# Patient Record
Sex: MALE | Race: AMERICAN INDIAN OR ALASKA NATIVE
[De-identification: names, ages, dates, MRNs, and addresses within clinical notes are randomized per-mention and may not be internally consistent; named-entity substitution may affect disease eponyms.]

---

## 2017-05-09 ENCOUNTER — HOSPITAL ENCOUNTER (EMERGENCY)
Dept: HOSPITAL 5 - ED | Age: 32
Discharge: HOME | End: 2017-05-09
Payer: SELF-PAY

## 2017-05-09 VITALS — SYSTOLIC BLOOD PRESSURE: 132 MMHG | DIASTOLIC BLOOD PRESSURE: 98 MMHG
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
## 2017-05-09 DIAGNOSIS — M25.522: Primary | ICD-10-CM

## 2017-05-09 NOTE — EMERGENCY DEPARTMENT REPORT
Upper Extremity





- HPI


Upper Extremity: Left Forearm


Occurred When: 4 Days


Mechanism: Unsure


Severity: severe


Symptoms: Yes Pain with Movement, No Deformity, No Limited Range of Movement, 

No Numbness (tingling to the fingers), No Weakness, No Swelling, No Bruising/

Ecchymosis, No Laceration or Abrasion


Other History: 32-year-old -American male comes in for complaint of left 

elbow pain that started about Saturday.  Patient reports that he had hit his 

elbow on Saturday and pain is progressively got worse to the point where last 

night he had to come in.  Patient reports that he is taking ibuprofen which 

equals up to 400 mg times one.  As well as to the trauma doctor 50 mg last 

night without much relief.  Patient denies any past medical history currently 

takes no medication and has no known drug allergies.





<KARELY CASTELLANOS - Last Filed: 05/09/17 10:41>





<YASEMIN RENE - Last Filed: 05/09/17 15:53>





- [facility]


Chief Complaint: Extremity Injury, Upper


Stated Complaint: LT ARM PAIN


Time Seen by Provider: 05/09/17 09:54





ED Review of Systems


ROS: 


Stated complaint: LT ARM PAIN


Other details as noted in HPI





Constitutional: denies: chills, fever


Eyes: denies: eye pain, eye discharge, vision change


ENT: denies: ear pain, throat pain


Respiratory: denies: cough, shortness of breath, wheezing


Cardiovascular: denies: chest pain, palpitations


Endocrine: no symptoms reported


Gastrointestinal: denies: abdominal pain, nausea, diarrhea


Genitourinary: denies: urgency, dysuria


Musculoskeletal: arthralgia (left elbow pain)


Skin: denies: rash, lesions


Neurological: denies: headache, weakness, paresthesias


Psychiatric: denies: anxiety, depression


Hematological/Lymphatic: denies: easy bleeding, easy bruising





<KARELY CASTELLANOS - Last Filed: 05/09/17 10:41>


ROS: 


Stated complaint: LT ARM PAIN


Other details as noted in HPI








<YASEMIN RENE - Last Filed: 05/09/17 15:53>





ED Past Medical Hx





- Past Medical History


Previous Medical History?: No





- Surgical History


Past Surgical History?: No





- Social History


Smoking Status: Current Every Day Smoker


Substance Use Type: Non Opiate Pain





<KARELY CASTELLANOS - Last Filed: 05/09/17 10:41>





<YASEMIN RENE - Last Filed: 05/09/17 15:53>





- Medications


Home Medications: 


 Home Medications











 Medication  Instructions  Recorded  Confirmed  Last Taken  Type


 


Cyclobenzaprine [Flexeril 10 MG 10 mg PO TID PRN #15 tablet 09/15/15  Unknown Rx





TAB]     


 


traMADol [Ultram] 50 mg PO Q4HR PRN #15 tablet 09/15/15  Unknown Rx


 


Ibuprofen [Motrin 600 MG tab] 600 mg PO Q8H PRN #30 tablet 05/09/17  Unknown Rx














Upper Extremity Exam





- Exam


General: 


Vital signs noted. No distress. Alert and acting appropriately.





Head and Torso: No HEENT Abnormality, No Neck Tenderness, No Chest/Lungs 

Abnormality, No Abdominal Tenderness, No Back Tenderness


Shoulder Exam: Yes Normal Range of Motion in Shoulder, No Shoulder Tenderness, 

No Clavicle Tenderness, No Shoulder Deformity, No AC Joint Tenderness


Elbow: Yes Elbow Tenderness, Yes Normal Range of Motion in Elbow, No Elbow 

Deformity


Forearm: No Forearm Tenderness, No Forearm Deformity, No Pain with Pronation, 

No Pain with Supination


Wrist: No Wrist Tenderness, No Normal ROM in Wrist, No Wrist Deformity, No 

Snuffbox Tenderness, No Pain with Axial Thumb Compression


Hand: Yes Normal ROM in Digit(s), No Hand Tenderness, No Hand Deformity, No 

Digit Tenderness, No Digit(s) Deformity


CMS Exam: Yes Normal Distal Pulses, Yes Normal Capillary Refill, Yes Normal 

Distal Sensation, No Broken Skin





<KARELY CASTELLANOS - Last Filed: 05/09/17 10:41>





- Exam


General: 


Vital signs noted. No distress. Alert and acting appropriately.








<YASEMIN RENE - Last Filed: 05/09/17 15:53>





ED Course


 Vital Signs











  05/09/17





  07:17


 


Temperature 97.7 F


 


Pulse Rate 81


 


Respiratory 18





Rate 


 


Blood Pressure 132/98


 


O2 Sat by Pulse 98





Oximetry 














<JASMINKARELY ZHU - Last Filed: 05/09/17 10:41>


 Vital Signs











  05/09/17 05/09/17





  07:17 11:20


 


Temperature 97.7 F 98.7 F


 


Pulse Rate 81 80


 


Respiratory 18 18





Rate  


 


Blood Pressure 132/98 


 


Blood Pressure  132/98





[Left]  


 


O2 Sat by Pulse 98 99





Oximetry  














<BEREKET RENEA RASHID - Last Filed: 05/09/17 15:53>





ED Medical Decision Making





- Medical Decision Making





Patient has been evaluated by this provider fast track.  Discussed with patient 

that we will do an x-ray of his left elbow.  We will give patient pain 

medication will consist of Norco 5 mg over 325.  We will determine treatment 

based on x-ray results.  Patient verbalized understanding





<KARELY CASTELLANOS - Last Filed: 05/09/17 10:41>





- Radiology Data


Radiology results: report reviewed (xr left elbow, normal)





<EDGARDYASEMIN C - Last Filed: 05/09/17 15:53>


Critical care attestation.: 


If time is entered above; I have spent that time in minutes in the direct care 

of this critically ill patient, excluding procedure time.








<KARELY CASTELLANOS - Last Filed: 05/09/17 10:41>


Critical care attestation.: 


If time is entered above; I have spent that time in minutes in the direct care 

of this critically ill patient, excluding procedure time.








<EDGARDYASEMIN RASHID - Last Filed: 05/09/17 15:53>





ED Disposition


Is pt being admited?: No


Does the pt Need Aspirin: No





<KARELY CASTELLANOS - Last Filed: 05/09/17 10:41>





<EDGARDYASEMIN RASHID - Last Filed: 05/09/17 15:53>


Disposition: DISCHARGED TO HOME OR SELFCARE


Condition: Stable


Instructions:  Elbow Sprain (ED)


Additional Instructions: 


Please take pain medication as prescribed.  There is no fracture of your elbow. 

You can do ice therapy.  


Prescriptions: 


Ibuprofen [Motrin 600 MG tab] 600 mg PO Q8H PRN #30 tablet


 PRN Reason: Pain


Referrals: 


PRIMARY CARE,MD [Primary Care Provider] - 3-5 Days


Forms:  Accompanied Note, Work/School Release Form(ED)

## 2017-05-09 NOTE — XRAY REPORT
LEFT ELBOW, 3 views:



History: Left elbow pain after trauma



The bony architecture is intact without evidence of fracture or

dislocation.  No significant soft tissue abnormality is seen.



IMPRESSION:

Normal left elbow.

## 2018-06-10 ENCOUNTER — HOSPITAL ENCOUNTER (EMERGENCY)
Dept: HOSPITAL 5 - ED | Age: 33
End: 2018-06-10
Payer: SELF-PAY

## 2018-06-10 VITALS — DIASTOLIC BLOOD PRESSURE: 83 MMHG | SYSTOLIC BLOOD PRESSURE: 137 MMHG

## 2018-06-10 DIAGNOSIS — M25.559: Primary | ICD-10-CM

## 2018-06-10 DIAGNOSIS — Z53.21: ICD-10-CM

## 2019-04-28 ENCOUNTER — HOSPITAL ENCOUNTER (EMERGENCY)
Dept: HOSPITAL 5 - ED | Age: 34
Discharge: LEFT BEFORE DISCHARGE | End: 2019-04-28
Payer: SELF-PAY

## 2019-04-28 VITALS — DIASTOLIC BLOOD PRESSURE: 79 MMHG | SYSTOLIC BLOOD PRESSURE: 118 MMHG

## 2019-04-28 DIAGNOSIS — R07.0: Primary | ICD-10-CM

## 2019-04-28 DIAGNOSIS — Z53.21: ICD-10-CM
